# Patient Record
Sex: FEMALE | Race: WHITE | NOT HISPANIC OR LATINO | URBAN - METROPOLITAN AREA
[De-identification: names, ages, dates, MRNs, and addresses within clinical notes are randomized per-mention and may not be internally consistent; named-entity substitution may affect disease eponyms.]

---

## 2022-06-29 ENCOUNTER — APPOINTMENT (RX ONLY)
Dept: URBAN - METROPOLITAN AREA CLINIC 116 | Facility: CLINIC | Age: 57
Setting detail: DERMATOLOGY
End: 2022-06-29

## 2022-06-29 DIAGNOSIS — D49.2 NEOPLASM OF UNSPECIFIED BEHAVIOR OF BONE, SOFT TISSUE, AND SKIN: ICD-10-CM

## 2022-06-29 PROCEDURE — ? DEFER

## 2022-06-29 NOTE — PROCEDURE: DEFER
Reason To Defer Override: pt declines biopsy today, will come back in one week.
Detail Level: Detailed
Introduction Text (Please End With A Colon): Sangeeta Henriquez )

## 2022-06-29 NOTE — PROCEDURE: MIPS QUALITY
Additional Notes: Patient states they donât take any medications
Detail Level: Detailed
Quality 130: Documentation Of Current Medications In The Medical Record: Current Medications Documented